# Patient Record
Sex: FEMALE | Race: WHITE | NOT HISPANIC OR LATINO | Employment: FULL TIME | URBAN - METROPOLITAN AREA
[De-identification: names, ages, dates, MRNs, and addresses within clinical notes are randomized per-mention and may not be internally consistent; named-entity substitution may affect disease eponyms.]

---

## 2017-02-08 PROBLEM — R10.11 RUQ ABDOMINAL PAIN: Status: ACTIVE | Noted: 2017-02-08

## 2017-06-09 DIAGNOSIS — Z01.812 PRE-OPERATIVE LABORATORY EXAMINATION: ICD-10-CM

## 2017-06-09 LAB
ANION GAP SERPL CALC-SCNC: 9 MMOL/L (ref 0–11.9)
BUN SERPL-MCNC: 30 MG/DL (ref 8–22)
CALCIUM SERPL-MCNC: 9.9 MG/DL (ref 8.5–10.5)
CHLORIDE SERPL-SCNC: 102 MMOL/L (ref 96–112)
CO2 SERPL-SCNC: 26 MMOL/L (ref 20–33)
CREAT SERPL-MCNC: 0.67 MG/DL (ref 0.5–1.4)
GFR SERPL CREATININE-BSD FRML MDRD: >60 ML/MIN/1.73 M 2
GLUCOSE SERPL-MCNC: 93 MG/DL (ref 65–99)
POTASSIUM SERPL-SCNC: 3.7 MMOL/L (ref 3.6–5.5)
SODIUM SERPL-SCNC: 137 MMOL/L (ref 135–145)

## 2017-06-09 PROCEDURE — 80048 BASIC METABOLIC PNL TOTAL CA: CPT

## 2017-06-09 PROCEDURE — 36415 COLL VENOUS BLD VENIPUNCTURE: CPT

## 2017-06-09 RX ORDER — OMEPRAZOLE 20 MG/1
20 CAPSULE, DELAYED RELEASE ORAL DAILY
COMMUNITY
End: 2018-10-29

## 2017-06-09 NOTE — OR NURSING
preadmit appointment: Pt. Instructed to continue regularly prescribed medication through the day before surgery, instructed to take the following medications, the day of surgery, with a sip of water per anesthesia protocol :omeprazole

## 2017-06-14 ENCOUNTER — HOSPITAL ENCOUNTER (OUTPATIENT)
Facility: MEDICAL CENTER | Age: 57
End: 2017-06-14
Attending: INTERNAL MEDICINE | Admitting: INTERNAL MEDICINE
Payer: COMMERCIAL

## 2017-06-14 VITALS
RESPIRATION RATE: 18 BRPM | DIASTOLIC BLOOD PRESSURE: 84 MMHG | BODY MASS INDEX: 30.19 KG/M2 | SYSTOLIC BLOOD PRESSURE: 125 MMHG | WEIGHT: 181.22 LBS | HEIGHT: 65 IN | TEMPERATURE: 97.5 F | OXYGEN SATURATION: 96 % | HEART RATE: 83 BPM

## 2017-06-14 PROBLEM — R93.2: Status: ACTIVE | Noted: 2017-06-14

## 2017-06-14 PROCEDURE — 700102 HCHG RX REV CODE 250 W/ 637 OVERRIDE(OP)

## 2017-06-14 PROCEDURE — A9270 NON-COVERED ITEM OR SERVICE: HCPCS

## 2017-06-14 PROCEDURE — 160048 HCHG OR STATISTICAL LEVEL 1-5: Performed by: INTERNAL MEDICINE

## 2017-06-14 PROCEDURE — 160009 HCHG ANES TIME/MIN: Performed by: INTERNAL MEDICINE

## 2017-06-14 PROCEDURE — 500066 HCHG BITE BLOCK, ECT: Performed by: INTERNAL MEDICINE

## 2017-06-14 PROCEDURE — 160002 HCHG RECOVERY MINUTES (STAT): Performed by: INTERNAL MEDICINE

## 2017-06-14 PROCEDURE — 700101 HCHG RX REV CODE 250

## 2017-06-14 PROCEDURE — 160203 HCHG ENDO MINUTES - 1ST 30 MINS LEVEL 4: Performed by: INTERNAL MEDICINE

## 2017-06-14 PROCEDURE — 160025 RECOVERY II MINUTES (STATS): Performed by: INTERNAL MEDICINE

## 2017-06-14 PROCEDURE — 160046 HCHG PACU - 1ST 60 MINS PHASE II: Performed by: INTERNAL MEDICINE

## 2017-06-14 PROCEDURE — 700111 HCHG RX REV CODE 636 W/ 250 OVERRIDE (IP)

## 2017-06-14 PROCEDURE — 160035 HCHG PACU - 1ST 60 MINS PHASE I: Performed by: INTERNAL MEDICINE

## 2017-06-14 PROCEDURE — 160208 HCHG ENDO MINUTES - EA ADDL 1 MIN LEVEL 4: Performed by: INTERNAL MEDICINE

## 2017-06-14 RX ORDER — CHLORHEXIDINE GLUCONATE ORAL RINSE 1.2 MG/ML
SOLUTION DENTAL
Status: DISCONTINUED
Start: 2017-06-14 | End: 2017-06-14 | Stop reason: HOSPADM

## 2017-06-14 RX ORDER — SODIUM CHLORIDE, SODIUM LACTATE, POTASSIUM CHLORIDE, CALCIUM CHLORIDE 600; 310; 30; 20 MG/100ML; MG/100ML; MG/100ML; MG/100ML
1000 INJECTION, SOLUTION INTRAVENOUS
Status: COMPLETED | OUTPATIENT
Start: 2017-06-14 | End: 2017-06-14

## 2017-06-14 RX ADMIN — SODIUM CHLORIDE, SODIUM LACTATE, POTASSIUM CHLORIDE, CALCIUM CHLORIDE 1000 ML: 600; 310; 30; 20 INJECTION, SOLUTION INTRAVENOUS at 08:00

## 2017-06-14 ASSESSMENT — PAIN SCALES - GENERAL
PAINLEVEL_OUTOF10: 0
PAINLEVEL_OUTOF10: 0
PAINLEVEL_OUTOF10: 3
PAINLEVEL_OUTOF10: 0

## 2017-06-14 NOTE — PROGRESS NOTES
Indication:Abdominal pain. Possible dilated bile duct.   Risks, benefits, and alternatives were discussed with consenting person(s). Consenting person(s) were given an opportunity to ask questions and discuss other options. Risks including but not limited to failed or incomplete EUS, ineffective therapy, pancreatitis (with potential future complications), perforation, infection, bleeding, missed lesion(s), missed diagnosis, cardiac and/or pulmonary event, aspiration, stroke, possible need for surgery, hospitalization possibly prolonged, discomfort, unsuccessful and/or incomplete procedure, possible need for repeat procedures and/or additional testings, damage to adjacent organs and/or vascular structures, medication reaction, disability, death, and other adverse events possibly life-threatening. Discussion was undertaken with Layman's terms. Consenting persons stated understanding and acceptance of these risks, and wished to proceed. Consent was given in clear state of mind.    Indication:Abdominal pain, possible dilated bile duct.   Risks, benefits, and alternatives were discussed with with consenting person(s). Consenting person(s) were given an opportunity to ask questions and discuss other options. Risks including but not limited to failed or incomplete ERCP, stent migration, ineffective therapy, radiation exposure, pancreatitis (with potential future complications), contrast reaction, perforation, infection, bleeding, missed lesion(s), cardiac and/or pulmonary event, aspiration, stroke, possible need for surgery, hospitalization possibly prolonged, discomfort, unsuccessful and/or incomplete procedure, possible need for repeat procedures and/or additional testings, damage to adjacent organs and/or vascular structures, medication reaction, disability, death, and other adverse events possibly life-threatening. Discussion was undertaken with Layman's terms. Consenting persons stated understanding and acceptance of  these risks, and wished to proceed. Consent was given in clear state of mind.

## 2017-06-14 NOTE — IP AVS SNAPSHOT
6/14/2017    Rebeca Cooper Saurabh   [address]  [facility] [address]    Dear Rebeca:    [facility] wants to ensure your discharge home is safe and you or your loved ones have had all of your questions answered regarding your care after you leave the hospital.    Below is a list of resources and contact information should you have any questions regarding your hospital stay, follow-up instructions, or active medical symptoms.    Questions or Concerns Regarding… Contact   Medical Questions Related to Your Discharge  (7 days a week, 8am-5pm) Contact a Nurse Care Coordinator   [phone]   Medical Questions Not Related to Your Discharge  (24 hours a day / 7 days a week)  Contact the Nurse Health Line   [phone]    Medications or Discharge Instructions Refer to your discharge packet   or contact your [facility] Primary Care Provider   [phone]   Follow-up Appointment(s) Schedule your appointment via beqom   or contact Scheduling [phone]   Billing Review your statement via beqom  or contact Billing [phone]   Medical Records Review your records via beqom   or contact Medical Records [phone]     You may receive a telephone call within two days of discharge. This call is to make certain you understand your discharge instructions and have the opportunity to have any questions answered. You can also easily access your medical information, test results and upcoming appointments via the beqom free online health management tool. You can learn more and sign up at [email or web address]/[email or web address]. For assistance setting up your beqom account, please call [phone].    Once again, we want to ensure your discharge home is safe and that you have a clear understanding of any next steps in your care. If you have any questions or concerns, please do not hesitate to contact us, we are here for you. Thank you for choosing [facility] for your healthcare needs.    Sincerely,    Your [facility] Healthcare Team

## 2017-06-14 NOTE — OR SURGEON
Immediate Post-Operative Note      PreOp Diagnosis: Right upper quadrant pain    PostOp Diagnosis: No gross dilation of the CBD (max to 0.67cm). Pancreas appeared normal. GB normal. EGD overall normal.     Procedure(s):  GASTROSCOPY - Wound Class: Clean Contaminated  EGD W/ENDOSCOPIC ULTRASOUND LINEAR-UPPER - Wound Class: Clean Contaminated    Surgeon(s):  Kee Lawrence M.D.    Anesthesiologist/Type of Anesthesia:  Anesthesiologist: Josias Fine M.D./General    Surgical Staff:  Circulator: Edil Garrett R.N.  Endoscopy Technician: Jagjit Fall    Specimen: None    Estimated Blood Loss: None    Findings: As above    Complications: None    Recommendations:  1. Follow-up with Dr. Benítez  2. Monitor for LFT and dilated CBD. If recurrence or elevated LFT, consideration for SOD in DDX.     Dictation [id number]    6/14/2017 9:37 AM Kee Lawrence

## 2017-06-14 NOTE — OR NURSING
0940 Pt to PACU from OR via gurney, respirations spontaneous and non-labored via 3L NC. Pt not arousable on calling, VSS.  0945 Pt arousable on calling denies nausea and pain.   0955 Pt awake continues to deny pain and nausea, VSS.   1010 Pt meets criteria for transfer to stage II.   1015 Pt transferred to stage II.

## 2017-06-14 NOTE — OR NURSING
0740 Pt. Allergies and NPO status verified, home medications reconciled. Belongings secured. Pt. Verbalizes understanding of pain scale, expected course of stay and plan of care. Surgical site verified with pt. Pt. And family given home care instructions for discharge planning. IV access established.

## 2017-06-14 NOTE — IP AVS SNAPSHOT
" Home Care Instructions                                                                                                                Name:Rebeca Wiley  Medical Record Number:[id number]  CSN: [id number]    YOB: 1960   Age: 57 y.o.  Sex: female  HT:1.651 m (5' 5\") WT: 82.2 kg (181 lb 3.5 oz)          Admit Date: 6/14/2017     Discharge Date:   Today's Date: 6/14/2017  Attending Doctor:  Kee Lawrence M.D.                  Allergies:  Penicillins                Discharge Instructions       ENDOSCOPY HOME CARE INSTRUCTIONS    GASTROSCOPY OR ERCP  1. Don't eat or drink anything for about an hour after the test. You can then resume your regular diet.  2. Don't drive or drink alcohol for 24 hours. The medication you received will make you too drowsy.  3. Don't take any coffee, tea, or aspirin products until after you see your doctor. These can harm the lining of your stomach.  4. If you begin to vomit bloody material, or develop black or bloody stools, call your doctor as soon as possible.  5. If you have any neck, chest, abdominal pain or temp of 100 degrees, call your doctor.  6. See your doctor: follow up with Dr. Benítez  7. Additional instructions: none given   8. Prescriptions: none given     Dr. Lawrence [phone]     You should call [phone] if you develop problems with breathing or chest pain.  If any questions arise, call your doctor. If your doctor is not available, please feel free to call [phone]. You can also call the HEALTH HOTLINE open 24 hours/day, 7 days/week and speak to a nurse at [phone], or toll free [phone].    Depression / Suicide Risk    As you are discharged from this [facility] facility, it is important to learn how to keep safe from harming yourself.    Recognize the warning signs:  · Abrupt changes in personality, positive or negative- including increase in energy   · Giving away possessions  · Change in eating patterns- significant weight changes-  positive or " negative  · Change in sleeping patterns- unable to sleep or sleeping all the time   · Unwillingness or inability to communicate  · Depression  · Unusual sadness, discouragement and loneliness  · Talk of wanting to die  · Neglect of personal appearance   · Rebelliousness- reckless behavior  · Withdrawal from people/activities they love  · Confusion- inability to concentrate     If you or a loved one observes any of these behaviors or has concerns about self-harm, here's what you can do:  · Talk about it- your feelings and reasons for harming yourself  · Remove any means that you might use to hurt yourself (examples: pills, rope, extension cords, firearm)  · Get professional help from the community (Mental Health, Substance Abuse, psychological counseling)  · Do not be alone:Call your Safe Contact- someone whom you trust who will be there for you.  · Call your local CRISIS HOTLINE 918-7145 or 807-839-8949  · Call your local Children's Mobile Crisis Response Team [place] (114) 372-2425 or www.[employer]  · Call the toll free National Suicide Prevention Hotlines   · National Suicide Prevention Lifeline 964-341-WWGP (1687)  · National Hope Line Network 800-SUICIDE (763-0110)    I acknowledge receipt and understanding of these Home Care Instructions.       Medication List      CONTINUE taking these medications        Instructions    Morning Afternoon Evening Bedtime    DHEA 25 MG Tabs        Take 25 mg by mouth every day.   Dose:  25 mg                        diclofenac EC 75 MG Tbec   Commonly known as:  VOLTAREN        TAKE 1 TABLET ORALLY TWICE DAILY                        fish oil 1000 MG Caps capsule        Take 1,000 mg by mouth 3 times a day, with meals.   Dose:  1000 mg                        fluticasone 50 MCG/ACT nasal spray   Commonly known as:  FLONASE        Spray 1 Spray in nose every day.   Dose:  1 Spray                        lisinopril-hydrochlorothiazide 20-12.5 MG per tablet   Commonly  known as:  PRINZIDE, ZESTORETIC        TAKE ONE TABLET BY MOUTH EVERY DAY                        omeprazole 20 MG delayed-release capsule   Commonly known as:  PRILOSEC        Take 20 mg by mouth every day.   Dose:  20 mg                        RESTASIS 0.05 % ophthalmic emulsion   Generic drug:  cyclosporin        Place 1 Drop in both eyes 2 times a day.   Dose:  1 Drop                        vitamin D 1000 UNIT Tabs   Commonly known as:  cholecalciferol        Take 1,000 Units by mouth every day.   Dose:  1000 Units                                Medication Information     Above and/or attached are the medications your physician expects you to take upon discharge. Review all of your home medications and newly ordered medications with your doctor and/or pharmacist. Follow medication instructions as directed by your doctor and/or pharmacist. Please keep your medication list with you and share with your physician. Update the information when medications are discontinued, doses are changed, or new medications (including over-the-counter products) are added; and carry medication information at all times in the event of emergency situations.        Resources     Quit Smoking / Tobacco Use:    I understand the use of any tobacco products increases my chance of suffering from future heart disease or stroke and could cause other illnesses which may shorten my life. Quitting the use of tobacco products is the single most important thing I can do to improve my health. For further information on smoking / tobacco cessation call a Toll Free Quit Line at 1-863.865.6631 (*[employer] [place]) or 1-204.243.7700 (American Lung Association) or you can access the web based program at www.lungusa.org.    [place] Tobacco Users Help Line:  (912) 286-6290       Toll Free: 1-809.186.3527  Quit Tobacco Program [facility] (797)292-1347    Crisis Hotline:    [place] Crisis Hotline:  1-347-KJEZZAF or  1-318.406.5136    [place] Crisis Hotline:    1-562.600.9272 or 692-933-9612    Discharge Survey:   Thank you for choosing [facility]. We hope we did everything we could to make your hospital stay a pleasant one. You may be receiving a survey and we would appreciate your time and participation in answering the questions. Your input is very valuable to us in our efforts to improve our service to our patients and their families.            Signatures     My signature on this form indicates that:    1. I acknowledge receipt and understanding of these Home Care Instruction.  2. My questions regarding this information have been answered to my satisfaction.  3. I have formulated a plan with my discharge nurse to obtain my prescribed medications for home.    __________________________________      __________________________________                   Patient Signature                                 Guardian/Responsible Adult Signature      __________________________________                 __________       ________                       Nurse Signature                                               Date                 Time

## 2017-06-14 NOTE — DISCHARGE INSTRUCTIONS
ENDOSCOPY HOME CARE INSTRUCTIONS    GASTROSCOPY OR ERCP  1. Don't eat or drink anything for about an hour after the test. You can then resume your regular diet.  2. Don't drive or drink alcohol for 24 hours. The medication you received will make you too drowsy.  3. Don't take any coffee, tea, or aspirin products until after you see your doctor. These can harm the lining of your stomach.  4. If you begin to vomit bloody material, or develop black or bloody stools, call your doctor as soon as possible.  5. If you have any neck, chest, abdominal pain or temp of 100 degrees, call your doctor.  6. See your doctor: follow up with Dr. Benítez  7. Additional instructions: none given   8. Prescriptions: none given     Dr. Lawrence [phone]     You should call 911 if you develop problems with breathing or chest pain.  If any questions arise, call your doctor. If your doctor is not available, please feel free to call [phone]. You can also call the HEALTH HOTLINE open 24 hours/day, 7 days/week and speak to a nurse at [phone], or toll free [phone].    Depression / Suicide Risk    As you are discharged from this [facility], it is important to learn how to keep safe from harming yourself.    Recognize the warning signs:  · Abrupt changes in personality, positive or negative- including increase in energy   · Giving away possessions  · Change in eating patterns- significant weight changes-  positive or negative  · Change in sleeping patterns- unable to sleep or sleeping all the time   · Unwillingness or inability to communicate  · Depression  · Unusual sadness, discouragement and loneliness  · Talk of wanting to die  · Neglect of personal appearance   · Rebelliousness- reckless behavior  · Withdrawal from people/activities they love  · Confusion- inability to concentrate     If you or a loved one observes any of these behaviors or has concerns about self-harm, here's what you can do:  · Talk about it-  your feelings and reasons for harming yourself  · Remove any means that you might use to hurt yourself (examples: pills, rope, extension cords, firearm)  · Get professional help from the community (Mental Health, Substance Abuse, psychological counseling)  · Do not be alone:Call your Safe Contact- someone whom you trust who will be there for you.  · Call your local CRISIS HOTLINE 221-6488 or 305-619-9270  · Call your local Children's Mobile Crisis Response Team [place] (227) 339-8651 or [employer]  · Call the toll free National Suicide Prevention Hotlines   · National Suicide Prevention Lifeline 076-575-PXFW (6288)  · National Hope Line Network 800-SUICIDE (668-9383)    I acknowledge receipt and understanding of these Home Care Instructions.

## 2017-06-14 NOTE — PROCEDURES
DATE OF SERVICE:  06/14/2017    PROCEDURE:  Endoscopic gastroduodenoscopy/endoscopic ultrasound.    PREOPERATIVE DIAGNOSIS:  Right upper quadrant pain.    POSTOPERATIVE DIAGNOSIS:  No gross dilation of common bile duct based on   endoscopic ultrasound maximum up to 0.67 cm.  Pancreas appeared normal.    Gallbladder normal.  EGD overall normal with previously documented findings by   Dr. Benítez.    INSTRUMENT:  Olympus forward-view endoscope/linear echoendoscope.    SEDATION:  Josias Fine MD    PRE-ANESTHESIA ASSESSMENT:  Prior to procedure, history and physical was   performed.  Patient's medication allergies were reviewed.  Patient's tolerance   of previous anesthesia was also reviewed.    CONSENT:  Risk and benefits of procedure and sedation option risks were   discussed with patient including but not limited to sedation, bleeding,   perforation, missed diagnosis and missed lesion as well as post-biopsy   pancreatitis as well as ERCP pancreatitis.  Patient states understanding.  All   questions were answered and informed consent was obtained.    DESCRIPTION OF PROCEDURE:  After I obtained informed consent from the patient,   the patient was placed in the left lateral position.  Appropriate time-out   protocol was followed including correct patient, correct procedure, correct   equipment and room were confirmed.  Throughout the procedure, patient's blood   pressure, pulse and oxygen saturation were monitored continuously by the   anesthesiologist.  After appropriate level of sedation was achieved, the   forward-viewing endoscope was inserted through the oropharynx, esophagus was   intubated, passed down the gastrointestinal tract to the second portion of   duodenum.  Findings and intervention is documented below.  Scope was then   withdrawn.  In similar fashion, the linear echoendoscope was inserted into the   oropharynx, esophagus was intubated, passed down the gastrointestinal tract   to the second portion of  duodenum.  Findings and intervention as documented   below.  ERCP was not performed as previous evaluation based on labs were   normal with no elevation in liver function.  Also, MRCP, MRI, no gross   Dilation of the bile duct.    FINDINGS:  EGD:    Esophagus:  Normal GE junction approximately 35 cm hiatus is   at approximately 37 cm.  There was an irregular Z line was previously   biopsied, which were normal.  No additional biopsy was taken.    Stomach normal.  Retroflexion was normal.  No gross abnormalities.    Duodenum normal.  No gross abnormality.  Papilla was visualized endoscopically   without gross abnormalities noted.    ENDOSCOPIC ULTRASOUND:  Endoscopic examination with the side-viewing   echoendoscope was normal.  The major papilla was not seen endoscopically with   the side-viewing endoscope was sonographically appeared normal.  The bile duct   was not dilated and measured maximum of 0.68 cm in maximum diameter.  The   gallbladder was normal.  There was no thickening of the gallbladder wall, which   measured at 0.22 cm.  There were no stones or sludge noted.  The pancreatic   parenchyma appeared normal overall.  There were no masses, cysts, or stones   were visualized in the pancreatic parenchyma.  The main duct was followed from   the major papilla to the body excluding pancreas divisum.  The pancreatic   duct measured 0.26 cm at the head of the pancreas and 0.2 at the body of the   pancreas.  There were no lymph nodes seen in the upper abdomen mediastinum.    There were no masses seen.  Visualized portion of the liver was normal.  The left adrenal   appeared normal.    COMPLICATIONS:  None.    RECOMMENDATIONS:  1.  Follow up with Dr. Charlette Benítez.  2.  Monitor LFTs and dilated common duct.  If recurrence of elevated LFT,   consideration for SOD in differential diagnosis and consideration for CCK-HIDA   scan with SOD protocol.       ____________________________________     Kee Lawrence  CHIDI ADHIKARI    DD:  06/14/2017 09:44:41  DT:  06/14/2017 10:32:44    D#:  [id number]  Job#:  [id number]    cc: Charlette Benítez MD

## (undated) DEVICE — CANISTER SUCTION RIGID RED 1500CC (40EA/CA)

## (undated) DEVICE — CANNULA W/ SUPPLY TUBING O2 - (50/CA)

## (undated) DEVICE — SYRINGE DISP. 50CC LS - (40/BX)

## (undated) DEVICE — BASIN EMESIS DISP. - (250/CA)

## (undated) DEVICE — SYRINGE SAFETY 3 ML 18 GA X 1 1/2 BLUNT LL (100/BX 8BX/CA)

## (undated) DEVICE — GLOVE, LITE (PAIR)

## (undated) DEVICE — KIT ANESTHESIA W/CIRCUIT & 3/LT BAG W/FILTER (20EA/CA)

## (undated) DEVICE — LACTATED RINGERS INJ 1000 ML - (14EA/CA 60CA/PF)

## (undated) DEVICE — SUCTION INSTRUMENT YANKAUER BULBOUS TIP W/O VENT (50EA/CA)

## (undated) DEVICE — TUBE SUCTION YANKAUER  1/4 X 6FT (20EA/CA)"

## (undated) DEVICE — TUBE CONNECTING SUCTION - CLEAR PLASTIC STERILE 72 IN (50EA/CA)

## (undated) DEVICE — SENSOR SPO2 ADULT LNCS ADTX (20/BX) ORDER ITEM #19593

## (undated) DEVICE — SET EXTENSION WITH 2 PORTS (48EA/CA) ***PART #2C8610 IS A SUBSTITUTE*****

## (undated) DEVICE — TUBING CLEARLINK DUO-VENT - C-FLO (48EA/CA)

## (undated) DEVICE — BITE BLOCK ADULT 60FR (100EA/CA)

## (undated) DEVICE — MASK ANESTHESIA ADULT  - (100/CA)

## (undated) DEVICE — GOWN SURGEONS LARGE - (32/CA)

## (undated) DEVICE — SYRINGE SAFETY 10 ML 18 GA X 1 1/2 BLUNT LL (100/BX 4BX/CA)

## (undated) DEVICE — SPONGE GAUZE NON-STERILE 4X4 - (2000/CA 10PK/CA)

## (undated) DEVICE — KIT  I.V. START (100EA/CA)

## (undated) DEVICE — SYRINGE SAFETY 5 ML 18 GA X 1-1/2 BLUNT LL (100/BX 4BX/CA)

## (undated) DEVICE — NEPTUNE 4 PORT MANIFOLD - (20/PK)

## (undated) DEVICE — SITE INJ 7/8IN IV M LL ADPR - (100/CA) THIS IS A CUSTOM ITEM AND HAS A 30 DAY LEAD TIME

## (undated) DEVICE — CATHETER IV SAFETY 22 GA X 1 (50EA/BX)

## (undated) DEVICE — ELECTRODE 850 FOAM ADHESIVE - HYDROGEL RADIOTRNSPRNT (50/PK)